# Patient Record
Sex: FEMALE | Race: WHITE
[De-identification: names, ages, dates, MRNs, and addresses within clinical notes are randomized per-mention and may not be internally consistent; named-entity substitution may affect disease eponyms.]

---
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
## 2019-10-16 ENCOUNTER — HOSPITAL ENCOUNTER (EMERGENCY)
Dept: HOSPITAL 25 - UCCORT | Age: 19
Discharge: HOME | End: 2019-10-16
Payer: COMMERCIAL

## 2019-10-16 VITALS — SYSTOLIC BLOOD PRESSURE: 129 MMHG | DIASTOLIC BLOOD PRESSURE: 57 MMHG

## 2019-10-16 DIAGNOSIS — J06.9: Primary | ICD-10-CM

## 2019-10-16 DIAGNOSIS — F17.210: ICD-10-CM

## 2019-10-16 DIAGNOSIS — Z88.1: ICD-10-CM

## 2019-10-16 PROCEDURE — 87651 STREP A DNA AMP PROBE: CPT

## 2019-10-16 PROCEDURE — 99211 OFF/OP EST MAY X REQ PHY/QHP: CPT

## 2019-10-16 PROCEDURE — G0463 HOSPITAL OUTPT CLINIC VISIT: HCPCS

## 2019-10-16 NOTE — UC
Throat Pain/Nasal Jeffrey HPI





- HPI Summary


HPI Summary: 


19-year-old female presents with complaints of nasal congestion, runny nose, 

postnasal drip, sore throat, and bilateral ear fullness since yesterday.  

States she woke up this morning her sore throat was much more severe however it 

has improved throughout the day.  Associated with some subjective fever and 

chills.  Denies dysphagia, cough, chest pain, shortness of breath, abdominal 

pain, nausea, or vomiting.








- History of Current Complaint


Chief Complaint: UCRespiratory


Stated Complaint: COUGH/SORE THROAT


Time Seen by Provider: 10/16/19 20:18


Hx Obtained From: Patient


Hx Last Menstrual Period: 10/8/19


Pain Intensity: 3





- Allergies/Home Medications


Allergies/Adverse Reactions: 


 Allergies











Allergy/AdvReac Type Severity Reaction Status Date / Time


 


cefprozil [From Cefzil] Allergy  Unknown Verified 10/16/19 19:43





   Reaction  





   Details  











Home Medications: 


 Home Medications





NK [No Home Medications Reported]  10/16/19 [History Confirmed 10/16/19]











PMH/Surg Hx/FS Hx/Imm Hx


Previously Healthy: Yes - Denies significant PMH





- Surgical History


Surgical History: Yes


Surgery Procedure, Year, and Place: [place] [facility], 2016; T&A, 02/10/16, [facility]





- Family History


Known Family History: Positive: Diabetes





- Social History


Occupation: Employed Full-time


Lives: With Family


Alcohol Use: Occasionally


Substance Use Type: None


Smoking Status (MU): Light Every Day Tobacco Smoker


Type: Cigarettes


Amount Used/How Often: <1/2 PPD


Length of Time of Smoking/Using Tobacco: Since Age 14


Have You Smoked in the Last Year: Yes


Household Exposure Type: Cigarettes





- Immunization History


Most Recent Influenza Vaccination: Not the 2015/2016 Season


Vaccination Up to Date: Yes





Review of Systems


All Other Systems Reviewed And Are Negative: Yes


Constitutional: Positive: Fever - subjective, Chills


Skin: Negative: Rash


Eyes: Negative: Drainage, Eye Redness


ENT: Positive: Sore Throat, Nasal Discharge, Sinus Congestion, Sinus Pain/

Tenderness


Respiratory: Negative: Shortness Of Breath, Cough


Cardiovascular: Negative: Chest Pain


Gastrointestinal: Negative: Abdominal Pain, Vomiting, Nausea


Genitourinary: Positive: Negative


Musculoskeletal: Positive: Negative


Neurological: Positive: Negative


Is Patient Immunocompromised?: No





Physical Exam





- Summary


Physical Exam Summary: 


GENERAL APPEARANCE: Well developed, well nourished, alert and cooperative, and 

appears to be in no acute distress.





EYES: Conjunctiva clear. No drainage.





EARS: External auditory canals and tympanic membranes clear, hearing grossly 

intact.





NOSE: Moderate nasal congestion. Clear nasal discharge.





THROAT: Mild pharyngeal erythema with postnasal drip. Surgically absent 

tonsils. Uvula midline.





NECK: Neck supple, non-tender without lymphadenopathy.





CARDIAC: Normal S1 and S2. No S3, S4 or murmurs. Rhythm is regular. There is no 

peripheral edema, cyanosis or pallor. Extremities are warm and well perfused. 

Capillary refill is less than 2 seconds. Peripheral pulses intact.





LUNGS: Clear to auscultation without rales, rhonchi, wheezing or diminished 

breath sounds.





ABDOMEN: Positive bowel sounds. Soft, nondistended, nontender. No guarding or 

rebound. No masses or hepatosplenomegally.





MUSKULOSKELETAL: ROM intact to all extremities. No joint erythema or 

tenderness. Normal muscular development. Normal gait.





SKIN: Skin normal color, texture and turgor with no lesions or eruptions.





Triage Information Reviewed: Yes


Vital Signs: 


 Initial Vital Signs











Temp  97.8 F   10/16/19 19:43


 


Pulse  98   10/16/19 19:43


 


Resp  16   10/16/19 19:43


 


BP  129/57   10/16/19 19:43


 


Pulse Ox  100   10/16/19 19:43











Vital Signs Reviewed: Yes





Throat Pain/Nasal Course/Dx





- Course


Course Of Treatment: 


19-year-old female presents with complaints of nasal congestion, runny nose, 

postnasal drip, sore throat, and bilateral ear fullness since yesterday.  

States she woke up this morning her sore throat was much more severe however it 

has improved throughout the day.  Associated with some subjective fever and 

chills.  Denies dysphagia, cough, chest pain, shortness of breath, abdominal 

pain, nausea, or vomiting.  Afebrile.  Vital signs stable.  Patient had 

moderate nasal congestion with clear nasal discharge, mild pharyngeal erythema 

with postnasal drip, surgically absent tonsils, no cervical lymphadenopathy, 

clear bilateral breath sounds, and otherwise unremarkable exam.  Rapid strep 

test was negative.  Reviewed results with the patient.  Recommending 

symptomatically treatment for viral upper respiratory infection.  She is to 

follow-up with her primary care provider in 5-7 days if symptoms are not 

improving.  Anticipatory guidance and warning symptoms reviewed with patient.  

Verbalizes understanding and agrees with plan of care.





- Differential Dx/Diagnosis


Differential Diagnosis/HQI/PQRI: Mononucleosis, Pharyngitis, Sinusitis, 

Tonsillitis, URI


Provider Diagnosis: 


 Viral URI








Discharge ED





- Sign-Out/Discharge


Documenting (check all that apply): Patient Departure


All imaging exams completed and their final reports reviewed: No Studies





- Discharge Plan


Condition: Stable


Disposition: HOME


Patient Education Materials:  Upper Respiratory Infection (ED)


Forms:  *Work Release


Referrals: 


Henri Larkin MD [Primary Care Provider] - 5 Days


Additional Instructions: 


Your history and exam are consistent with a viral upper respiratory infection. 

Viral infections do not respond to antibiotics and are limited to the treatment 

of symptoms. Viral infections typically run their course in 7-10 days.





Drink plenty of fluids to avoid dehydration especially if you are running any 

fever.





Use a saline rinse kit such as Neti Pot or NeilMed at least twice a day to help 

thin secretions and promote drainage of the sinuses.





Use over the counter fluticasone (Flonase) nasal spray 2 sprays each nostril 

once daily.





Try using an over the counter decongestant such as Sudafed according to 

directions for the congestion.





Take over the counter acetaminophen (Tylenol) or ibuprofen (Advil, Motrin) 

according to directions as needed for pain or fever.





Use salt water gargles several times a day if you have a sore throat.





You may also use Chloraseptic spray or Cepacol lonzenges according to 

directions which contain a numbing medication and can provide some temporary 

relief from your sore throat.





Follow up with your primary care provider in 5-7 days if symptoms persist.





Seek immediate medical attention in the emergency room if you have fever 

greater than 100.5 F despite taking acetaminophen or ibuprofen, have chest pain

, difficulty breathing, are unable to swallow, or have any worsening of 

symptoms.





- Billing Disposition and Condition


Condition: STABLE


Disposition: Home

## 2020-01-21 ENCOUNTER — HOSPITAL ENCOUNTER (EMERGENCY)
Dept: HOSPITAL 25 - UCCORT | Age: 20
Discharge: HOME | End: 2020-01-21
Payer: SELF-PAY

## 2020-01-21 VITALS — SYSTOLIC BLOOD PRESSURE: 128 MMHG | DIASTOLIC BLOOD PRESSURE: 77 MMHG

## 2020-01-21 DIAGNOSIS — F17.210: ICD-10-CM

## 2020-01-21 DIAGNOSIS — Z88.1: ICD-10-CM

## 2020-01-21 DIAGNOSIS — R10.33: Primary | ICD-10-CM

## 2020-01-21 DIAGNOSIS — R19.7: ICD-10-CM

## 2020-01-21 LAB
FLUAV RNA SPEC QL NAA+PROBE: NEGATIVE
FLUBV RNA SPEC QL NAA+PROBE: NEGATIVE

## 2020-01-21 PROCEDURE — 84702 CHORIONIC GONADOTROPIN TEST: CPT

## 2020-01-21 PROCEDURE — 99212 OFFICE O/P EST SF 10 MIN: CPT

## 2020-01-21 PROCEDURE — 81003 URINALYSIS AUTO W/O SCOPE: CPT

## 2020-01-21 PROCEDURE — G0463 HOSPITAL OUTPT CLINIC VISIT: HCPCS

## 2020-01-21 NOTE — UC
FLU HPI





- HPI Summary


HPI Summary: 





18 yo female presents with abdominal pain and diarrhea. She tells me that for 

the last 3-4 days she has had periumbilical abdominal pain with loose stools 

and diarrhea >7times a day. She has had a decreased appetite and felt nauseous 

intermittently. Today she states her pain is the worst it has been and seems to 

be more to the right of her belly button. She feels very tired overall. Has not 

taken anything OTC for her symptoms. Felt feverish last night, but did not take 

her temperature. Reports that about 2-3 weeks ago she was in the ER and she was 

given two one-time doses of anbx for an animal bite. Denies SOB, chest pain, 

back pain, flank pain, vaginal bleeding or discharge, or dysuria. 





- History of Current Complaint


Stated Complaint: FLU LIKE SYMPTOMS


Time Seen by Provider: 01/21/20 20:31


Hx Obtained From: Patient


Hx Last Menstrual Period: 10/8/19


Onset/Duration: Gradual Onset


Severity Currently: Moderate


Severity Initially: Moderate


Pain Intensity: 7


Pain Scale Used: 0-10 Numeric





- Allergy/Home Medications


Allergies/Adverse Reactions: 


 Allergies











Allergy/AdvReac Type Severity Reaction Status Date / Time


 


cefprozil [From Cefzil] Allergy  Unknown Verified 01/21/20 20:37





   Reaction  





   Details  














PMH/Surg Hx/FS Hx/Imm Hx





- Additional Past Medical History


Additional PMH: 


None





- Surgical History


Surgical History: Yes


Surgery Procedure, Year, and Place: [place], 2016; T&A, 02/10/16, [facility]





- Family History


Known Family History: Positive: Diabetes





- Social History


Occupation: Employed Full-time


Lives: With Family


Alcohol Use: Occasionally


Substance Use Type: None


Smoking Status (MU): Light Every Day Tobacco Smoker


Type: Cigarettes


Amount Used/How Often: <1/2 PPD


Length of Time of Smoking/Using Tobacco: Since Age 14


Have You Smoked in the Last Year: Yes


Household Exposure Type: Cigarettes





- Immunization History


Most Recent Influenza Vaccination: Not the 2015/2016 Season


Vaccination Up to Date: Yes





Review of Systems


All Other Systems Reviewed And Are Negative: No


Constitutional: Positive: Fatigue


Skin: Positive: Negative


Eyes: Positive: Negative


ENT: Positive: Negative


Respiratory: Positive: Negative


Cardiovascular: Positive: Negative


Gastrointestinal: Positive: Abdominal Pain, Diarrhea


Genitourinary: Positive: Negative


Neurovascular: Positive: Negative


Neurological: Positive: Negative


Psychological: Positive: Negative





Physical Exam





- Summary


Physical Exam Summary: 





GENERAL: NAD. WDWN. No pain distress.


SKIN: No rashes, sores, lesions, or open wounds.


NECK: Supple. Nontender. No lymphadenopathy. 


CHEST:  CTAB. No r/r/w. No accessory muscle use. Breathing comfortably and in 

no distress.


CV: Tachycardic. Pulses intact. Brisk cap refill.


ABDOMEN: Mild periumbilical and RLQ TTP. Mild RLQ guarding. No CVA tenderness. 

Bowel sounds present


NEURO: Alert. 


PSYCH: Age appropriate behavior.





Triage Information Reviewed: Yes


Vital Signs: 





Vital Signs:











Temp Pulse Resp BP Pulse Ox


 


 98.8 F   114   12   128/77   98 


 


 01/21/20 20:32  01/21/20 20:32  01/21/20 20:32  01/21/20 20:32  01/21/20 20:32








 Laboratory Tests











  01/21/20 01/21/20 01/21/20





  20:44 20:45 20:47


 


POC Urine Color   Yellow 


 


POC Urine Clarity   Clear 


 


POC Urine pH   5.5 


 


POC Ur Specif Gravity   >= 1.030 


 


POC Urine Protein   Negative 


 


POC Ur Glucose (UA)   Negative 


 


POC Urine Ketones   Negative 


 


POC Urine Blood   Negative 


 


POC Urine Nitrite   Negative 


 


POC Urine Bilirubin   1+ A 


 


POC Urine Urobilinogen   0.2 


 


POC U Leukocyte Esteras   Negative 


 


POC Ur Pregnancy Test    Negative


 


Influenza A (Rapid)  Negative  


 


Influenza B (Rapid)  Negative  











Vital Signs Reviewed: Yes





Flu Course/Dx





- Course


Course Of Treatment: 


UA and urine pregnancy negative.


POC flu negative.


DDx includes C. diff and appendicitis and viral diarrhea.


--- recommend going to the ED for further evaluation. Pt was agreeable to this 

and her boyfriend with her now will drive her. 





- Differential Dx/Diagnosis


Provider Diagnosis: 


 Periumbilical pain, Diarrhea








Discharge ED





- Sign-Out/Discharge


Documenting (check all that apply): Patient Departure


All imaging exams completed and their final reports reviewed: No Studies





- Discharge Plan


Condition: Stable


Disposition: HOME


Referrals: 


Henri Larkin MD [Primary Care Provider] - 


Additional Instructions: 


Please go to the ER for further evaluation of your RLQ pain and diarrhea





- Billing Disposition and Condition


Condition: STABLE


Disposition: Home